# Patient Record
Sex: FEMALE | Race: WHITE | Employment: FULL TIME | URBAN - METROPOLITAN AREA
[De-identification: names, ages, dates, MRNs, and addresses within clinical notes are randomized per-mention and may not be internally consistent; named-entity substitution may affect disease eponyms.]

---

## 2017-05-23 ENCOUNTER — HOSPITAL ENCOUNTER (OUTPATIENT)
Dept: MAMMOGRAPHY | Age: 42
Discharge: HOME | End: 2017-05-23
Attending: SPECIALIST
Payer: COMMERCIAL

## 2017-05-23 DIAGNOSIS — Z12.31 VISIT FOR SCREENING MAMMOGRAM: ICD-10-CM

## 2017-05-23 PROCEDURE — 77067 SCR MAMMO BI INCL CAD: CPT

## 2018-07-27 ENCOUNTER — HOSPITAL ENCOUNTER (OUTPATIENT)
Dept: MAMMOGRAPHY | Age: 43
Discharge: HOME | End: 2018-07-27
Attending: SPECIALIST
Payer: COMMERCIAL

## 2018-07-27 DIAGNOSIS — Z12.31 VISIT FOR SCREENING MAMMOGRAM: ICD-10-CM

## 2018-07-27 PROCEDURE — 77063 BREAST TOMOSYNTHESIS BI: CPT

## 2019-10-03 ENCOUNTER — HOSPITAL ENCOUNTER (OUTPATIENT)
Dept: MAMMOGRAPHY | Age: 44
Discharge: HOME | End: 2019-10-03
Attending: SPECIALIST
Payer: COMMERCIAL

## 2019-10-03 DIAGNOSIS — Z12.39 BREAST SCREENING: ICD-10-CM

## 2019-10-03 PROCEDURE — 77067 SCR MAMMO BI INCL CAD: CPT

## 2021-03-16 ENCOUNTER — TRANSCRIBE ORDER (OUTPATIENT)
Dept: SCHEDULING | Age: 46
End: 2021-03-16

## 2021-03-16 DIAGNOSIS — Z12.31 VISIT FOR SCREENING MAMMOGRAM: Primary | ICD-10-CM

## 2021-06-24 ENCOUNTER — HOSPITAL ENCOUNTER (OUTPATIENT)
Dept: MAMMOGRAPHY | Age: 46
Discharge: HOME | End: 2021-06-24
Attending: SPECIALIST
Payer: COMMERCIAL

## 2021-06-24 DIAGNOSIS — Z12.31 VISIT FOR SCREENING MAMMOGRAM: ICD-10-CM

## 2021-06-24 PROCEDURE — 77067 SCR MAMMO BI INCL CAD: CPT

## 2022-10-19 ENCOUNTER — OFFICE VISIT (OUTPATIENT)
Dept: CARDIOLOGY CLINIC | Age: 47
End: 2022-10-19
Payer: COMMERCIAL

## 2022-10-19 VITALS
HEART RATE: 80 BPM | DIASTOLIC BLOOD PRESSURE: 62 MMHG | HEIGHT: 64 IN | SYSTOLIC BLOOD PRESSURE: 114 MMHG | WEIGHT: 148 LBS | BODY MASS INDEX: 25.27 KG/M2 | OXYGEN SATURATION: 98 %

## 2022-10-19 DIAGNOSIS — R07.9 CHEST PAIN, UNSPECIFIED TYPE: ICD-10-CM

## 2022-10-19 DIAGNOSIS — Z76.89 ESTABLISHING CARE WITH NEW DOCTOR, ENCOUNTER FOR: Primary | ICD-10-CM

## 2022-10-19 DIAGNOSIS — E78.5 HYPERLIPIDEMIA, UNSPECIFIED HYPERLIPIDEMIA TYPE: ICD-10-CM

## 2022-10-19 DIAGNOSIS — R06.02 SOB (SHORTNESS OF BREATH): ICD-10-CM

## 2022-10-19 PROCEDURE — 93000 ELECTROCARDIOGRAM COMPLETE: CPT | Performed by: INTERNAL MEDICINE

## 2022-10-19 PROCEDURE — 99204 OFFICE O/P NEW MOD 45 MIN: CPT | Performed by: INTERNAL MEDICINE

## 2022-10-19 RX ORDER — RIMEGEPANT SULFATE 75 MG/75MG
75 TABLET, ORALLY DISINTEGRATING ORAL
COMMUNITY
Start: 2022-08-15

## 2022-10-19 NOTE — PROGRESS NOTES
Chief Complaint   Patient presents with    New Patient    Referral / Consult     By Marcial Ewing     Chest Pain    Dizziness     Numbness in arm       Vitals:    10/19/22 1121   BP: 114/62   Pulse: 80   Height: 5' 4\" (1.626 m)   Weight: 148 lb (67.1 kg)   SpO2: 98%         Chest pain: pressure-center- Heavy most of the time    SOB: random     Palpitations: no    Dizziness: SOB to Dizziness or activity to Dizzy     Swelling: no    Refills:       Have you been to the ER, urgent care clinic since your last visit? Hospitalized since your last visit? Have you sen or consulted other health care providers outside of the [facility] since your last visit?  (Include any pap smears or colon screening.)

## 2022-10-19 NOTE — PROGRESS NOTES
Mari Lemus MD, MS, [facility] [place]            HISTORY OF PRESENT ILLNESS:    Génesis Peres is a 48 y.o. female referred for CP, numbness. Started 10/3 - that night - numbness in right arm, woke up - works at home, sitting at desk - CP, shapr took her breat away. Numbness yed h    [facility] EKG and Labs work was ok,  EKG NSR, iRBBB. . Via [facility] - referred to Cardiology. EKG today. ++ FH Cad - PGF heart attacks, dad heart problems. Two uncles MI. Total time spent >35 min, reviewing my prior notes, referring physician notes, other subspecialty and primary care notes, all available lab values, all available cardiac testing, all available radiology imaging, vital signs, weights, medications, potential medication interactions, family history, preparing my notes, updating diagnoses, correcting chart errors from other providers, documenting the above, discussing findings/results/testing methodologies/plan with patient, ordering tests/lab, sending prescriptions. SUMMARY:   Problem List  Date Reviewed: 10/19/2022            Codes Class Noted    Establishing care with new doctor, encounter for ICD-10-CM: Z76.89  ICD-9-CM: V65.8  10/20/2022        SOB (shortness of breath) ICD-10-CM: R06.02  ICD-9-CM: 786.05  10/20/2022        Chest pain ICD-10-CM: R07.9  ICD-9-CM: 786.50  10/20/2022           Current Outpatient Medications on File Prior to Visit   Medication Sig    Nurtec ODT 75 mg disintegrating tablet Take 75 mg by mouth daily as needed. omega-3s/dha/epa/fish oil/D3 (VITAMIN-D + OMEGA-3 PO) Take  by mouth. No current facility-administered medications on file prior to visit. CARDIOLOGY STUDIES TO DATE:  No results found for this visit on 10/19/22.                 CARDIAC ROS:   positive for chest pain, chest pressure/discomfort, dyspnea, palpitations    Past Medical History:   Diagnosis Date    Headache        Family History   Problem Relation Age of Onset    Cancer Mother     Headache Mother     Breast Cancer Mother 59    Cancer Maternal Grandmother     Breast Cancer Maternal Grandmother 48    Stroke Paternal Grandfather        Social History     Socioeconomic History    Marital status:      Spouse name: Not on file    Number of children: Not on file    Years of education: Not on file    Highest education level: Not on file   Occupational History    Not on file   Tobacco Use    Smoking status: Never    Smokeless tobacco: Never   Substance and Sexual Activity    Alcohol use: Yes    Drug use: No    Sexual activity: Not on file   Other Topics Concern    Not on file   Social History Narrative    Not on file     Social Determinants of Health     Financial Resource Strain: Not on file   Food Insecurity: Not on file   Transportation Needs: Not on file   Physical Activity: Not on file   Stress: Not on file   Social Connections: Not on file   Intimate Partner Violence: Not on file   Housing Stability: Not on file        GENERAL ROS:  A comprehensive review of systems was negative except for that written in the HPI.     Visit Vitals  /62   Pulse 80   Ht 5' 4\" (1.626 m)   Wt 148 lb (67.1 kg)   SpO2 98%   BMI 25.40 kg/m²     Vitals:    10/19/22 1121   BP: 114/62   Pulse: 80   SpO2: 98%   Weight: 148 lb (67.1 kg)   Height: 5' 4\" (1.626 m)        Wt Readings from Last 3 Encounters:   10/19/22 148 lb (67.1 kg)   07/27/16 130 lb 3.2 oz (59.1 kg)   07/03/16 132 lb (59.9 kg)            BP Readings from Last 3 Encounters:   10/19/22 114/62   07/27/16 104/62   07/03/16 103/58       PHYSICAL EXAM  General appearance: alert, cooperative, no distress, appears stated age  Neck: supple, symmetrical, trachea midline, no adenopathy, thyroid: not enlarged, symmetric, no tenderness/mass/nodules, no carotid bruit, and no JVD  Lungs: clear to auscultation bilaterally  Heart: regular rate and rhythm, S1, S2 normal, no murmur, click, rub or gallop  Extremities: extremities normal, atraumatic, no cyanosis or edema    No results found for: CHOL, CHOLX, CHLST, CHOLV, [id number], HDL, HDLP, LDL, LDLC, DLDLP, TGLX, TRIGL, TRIGP, CHHD, CHHDX    Lab Results   Component Value Date/Time    WBC 5.5 07/03/2016 04:03 PM    HGB 13.6 07/03/2016 04:03 PM    HCT 41.7 07/03/2016 04:03 PM    PLATELET 305 91/57/2730 04:03 PM    MCV 92.1 07/03/2016 04:03 PM        No results found for: CHOL, CHOLPOCT, CHOLX, CHLST, CHOLV, HDL, HDLP, LDL, LDLC, DLDLP, TGLX, TRIGL, TRIGP, TGLPOCT, NTGLT, CHHD, CHHDX     ASSESSMENT    ICD-10-CM ICD-9-CM    1. Establishing care with new doctor, encounter for  Z76.89 V65.8 AMB POC EKG ROUTINE W/ 12 LEADS, INTER & REP      CT HEART W/O CONT WITH CALCIUM      2. SOB (shortness of breath)  R06.02 786.05 ECHO STRESS      CT HEART W/O CONT WITH CALCIUM      NMR LIPOPROFILE WITH LIPIDS (WITHOUT GRAPH)      LIPOPROTEIN (A)      CANCELED: LIPOPROTEIN (A)      CANCELED: NMR LIPOPROFILE WITH LIPIDS (WITHOUT GRAPH)      3. Chest pain, unspecified type  R07.9 786.50 ECHO STRESS      CT HEART W/O CONT WITH CALCIUM      NMR LIPOPROFILE WITH LIPIDS (WITHOUT GRAPH)      LIPOPROTEIN (A)      CANCELED: LIPOPROTEIN (A)      CANCELED: NMR LIPOPROFILE WITH LIPIDS (WITHOUT GRAPH)          Encounter Diagnoses   Name Primary?     Establishing care with new doctor, encounter for Yes    SOB (shortness of breath)     Chest pain, unspecified type      Orders Placed This Encounter    CT HEART W/O CONT WITH CALCIUM    NMR LIPOPROFILE    LIPOPROTEIN (A)    AMB POC EKG ROUTINE W/ 12 LEADS, INTER & REP    Nurtec ODT 75 mg disintegrating tablet    omega-3s/dha/epa/fish oil/D3 (VITAMIN-D + OMEGA-3 PO)       Josy Tavares MD  10/20/2022        [address]   [address] [facility] [address]  [address]  [phone] (F)    [address] [facility]  [id number] [facility] [address]  [phone] (P)  [phone] (F)    ATTENTION:   This medical record was transcribed using an electronic medical records/speech recognition system. Although proofread, it may and can contain electronic, spelling and other errors. Corrections may be executed at a later time. Please feel free to contact us for any clarifications as needed.

## 2022-10-19 NOTE — PROGRESS NOTES
Per Dr. Mimi Calderón-   [facility] lipid profile  Lp (a)  Stress echo  CCS   JEREMIAH    Lab slip and instruction provided to patient. CT CCS info provided to patient.

## 2022-10-20 PROBLEM — Z76.89 ESTABLISHING CARE WITH NEW DOCTOR, ENCOUNTER FOR: Status: ACTIVE | Noted: 2022-10-20

## 2022-10-20 PROBLEM — R06.02 SOB (SHORTNESS OF BREATH): Status: ACTIVE | Noted: 2022-10-20

## 2022-10-20 PROBLEM — R07.9 CHEST PAIN: Status: ACTIVE | Noted: 2022-10-20

## 2022-11-01 RX ORDER — ROSUVASTATIN CALCIUM 20 MG/1
20 TABLET, COATED ORAL
Qty: 90 TABLET | Refills: 3 | Status: SHIPPED | OUTPATIENT
Start: 2022-11-01

## 2022-11-22 ENCOUNTER — APPOINTMENT (OUTPATIENT)
Dept: CARDIOLOGY CLINIC | Age: 47
End: 2022-11-22

## 2022-11-22 ENCOUNTER — ANCILLARY PROCEDURE (OUTPATIENT)
Dept: CARDIOLOGY CLINIC | Age: 47
End: 2022-11-22

## 2022-11-22 ENCOUNTER — OFFICE VISIT (OUTPATIENT)
Dept: CARDIOLOGY CLINIC | Age: 47
End: 2022-11-22

## 2022-11-22 ENCOUNTER — HOSPITAL ENCOUNTER (OUTPATIENT)
Dept: CT IMAGING | Age: 47
Discharge: HOME | End: 2022-11-22
Attending: INTERNAL MEDICINE
Payer: SELF-PAY

## 2022-11-22 VITALS
HEIGHT: 64 IN | HEART RATE: 106 BPM | DIASTOLIC BLOOD PRESSURE: 62 MMHG | WEIGHT: 143.2 LBS | BODY MASS INDEX: 24.45 KG/M2 | SYSTOLIC BLOOD PRESSURE: 118 MMHG | OXYGEN SATURATION: 96 %

## 2022-11-22 VITALS — HEIGHT: 64 IN | BODY MASS INDEX: 25.27 KG/M2 | WEIGHT: 148 LBS

## 2022-11-22 DIAGNOSIS — R07.9 CHEST PAIN, UNSPECIFIED TYPE: ICD-10-CM

## 2022-11-22 DIAGNOSIS — Z76.89 ESTABLISHING CARE WITH NEW DOCTOR, ENCOUNTER FOR: ICD-10-CM

## 2022-11-22 DIAGNOSIS — R06.02 SOB (SHORTNESS OF BREATH): ICD-10-CM

## 2022-11-22 DIAGNOSIS — E78.41 ELEVATED LIPOPROTEIN(A): ICD-10-CM

## 2022-11-22 DIAGNOSIS — Z82.49 FAMILY HISTORY OF PREMATURE CAD: ICD-10-CM

## 2022-11-22 DIAGNOSIS — E78.5 HYPERLIPIDEMIA, UNSPECIFIED HYPERLIPIDEMIA TYPE: Primary | ICD-10-CM

## 2022-11-22 LAB
STRESS ANGINA INDEX: 0
STRESS BASELINE DIAS BP: 64 MMHG
STRESS BASELINE HR: 85 BPM
STRESS BASELINE ST DEPRESSION: 0 MM
STRESS BASELINE SYS BP: 114 MMHG
STRESS ESTIMATED WORKLOAD: 13.1 METS
STRESS EXERCISE DUR MIN: 11 MIN
STRESS EXERCISE DUR SEC: 0 SEC
STRESS O2 SAT PEAK: 99 %
STRESS O2 SAT REST: 99 %
STRESS PEAK DIAS BP: 80 MMHG
STRESS PEAK SYS BP: 158 MMHG
STRESS PERCENT HR ACHIEVED: 105 %
STRESS POST PEAK HR: 181 BPM
STRESS RATE PRESSURE PRODUCT: NORMAL BPM*MMHG
STRESS TARGET HR: 172 BPM

## 2022-11-22 PROCEDURE — 75571 CT HRT W/O DYE W/CA TEST: CPT

## 2022-11-22 PROCEDURE — 99214 OFFICE O/P EST MOD 30 MIN: CPT | Performed by: INTERNAL MEDICINE

## 2022-11-22 NOTE — PROGRESS NOTES
Marian Luther is a 48 y.o. female    Chief Complaint   Patient presents with    Follow-up    Shortness of Breath    Chest Pain    Cholesterol Problem       Vitals:    11/22/22 1146   BP: 118/62   BP 1 Location: Left upper arm   BP Patient Position: Sitting   Pulse: (!) 106   Height: 5' 4\" (1.626 m)   Weight: 143 lb 3.2 oz (65 kg)   SpO2: 96%       Chest pain no    SOB patient states SOB     Dizziness no    Swelling no    Refills no      1. Have you been to the ER, urgent care clinic since your last visit? Hospitalized since your last visit? No    2. Have you seen or consulted any other health care providers outside of the [address] [place] [address] since your last visit? Include any pap smears or colon screening.  No

## 2022-11-22 NOTE — PROGRESS NOTES
Liz Curran MD, MS, [facility] [place]            HISTORY OF PRESENT ILLNESS:    Clinton Burleson is a 48 y.o. female referred for CP, numbness here for FU. Started 10/3 - that night - numbness in right arm, woke up - works at home, sitting at desk - CP, shapr took her breat away. Numbness yed h    [facility] EKG and Labs work was ok,  EKG NSR, iRBBB. . Via [facility] - referred to Cardiology. EKG today. ++ FH Cad - PGF heart attacks, dad heart problems. Two uncles MI. . Started on rosuva 20. NMR lipid panel elevated Lp(a), elevated LDL-P. Family member have not tolerated statins. She is a good candidate for PCSK9 given Lp(a). Had CCS done earlier today - read not yet back. Stress echo done prior to her appt normal.    Discussed repeat lipid panel in 3-4 months, FU.      SUMMARY:   Problem List  Date Reviewed: 11/22/2022            Codes Class Noted    Elevated lipoprotein(a) ICD-10-CM: E78.41  ICD-9-CM: 272.8  11/22/2022        Hyperlipidemia ICD-10-CM: E78.5  ICD-9-CM: 272.4  11/22/2022        Family history of premature CAD ICD-10-CM: Z82.49  ICD-9-CM: V17.3  11/22/2022        Establishing care with new doctor, encounter for ICD-10-CM: Z76.89  ICD-9-CM: V65.8  10/20/2022        SOB (shortness of breath) ICD-10-CM: R06.02  ICD-9-CM: 786.05  10/20/2022        Chest pain ICD-10-CM: R07.9  ICD-9-CM: 786.50  10/20/2022           Current Outpatient Medications on File Prior to Visit   Medication Sig    rosuvastatin (CRESTOR) 20 mg tablet Take 1 Tablet by mouth nightly. Nurtec ODT 75 mg disintegrating tablet Take 75 mg by mouth daily as needed. No current facility-administered medications on file prior to visit. CARDIOLOGY STUDIES TO DATE:  No results found for this visit on 11/22/22.                 CARDIAC ROS:   positive for chest pain, chest pressure/discomfort, dyspnea, palpitations    Past Medical History:   Diagnosis Date    Headache Family History   Problem Relation Age of Onset    Cancer Mother     Headache Mother     Breast Cancer Mother 59    Cancer Maternal Grandmother     Breast Cancer Maternal Grandmother 48    Stroke Paternal Grandfather        Social History     Socioeconomic History    Marital status:      Spouse name: Not on file    Number of children: Not on file    Years of education: Not on file    Highest education level: Not on file   Occupational History    Not on file   Tobacco Use    Smoking status: Never    Smokeless tobacco: Never   Substance and Sexual Activity    Alcohol use: Yes    Drug use: No    Sexual activity: Not on file   Other Topics Concern    Not on file   Social History Narrative    Not on file     Social Determinants of Health     Financial Resource Strain: Not on file   Food Insecurity: Not on file   Transportation Needs: Not on file   Physical Activity: Not on file   Stress: Not on file   Social Connections: Not on file   Intimate Partner Violence: Not on file   Housing Stability: Not on file        GENERAL ROS:  A comprehensive review of systems was negative except for that written in the HPI.     Visit Vitals  /62 (BP 1 Location: Left upper arm, BP Patient Position: Sitting)   Pulse (!) 106   Ht 5' 4\" (1.626 m)   Wt 65 kg (143 lb 3.2 oz)   SpO2 96%   BMI 24.58 kg/m²     Vitals:    11/22/22 1146   BP: 118/62   Pulse: (!) 106   SpO2: 96%   Weight: 65 kg (143 lb 3.2 oz)   Height: 5' 4\" (1.626 m)        Wt Readings from Last 3 Encounters:   11/22/22 65 kg (143 lb 3.2 oz)   11/22/22 67.1 kg (148 lb)   10/19/22 67.1 kg (148 lb)            BP Readings from Last 3 Encounters:   11/22/22 118/62   10/19/22 114/62   07/27/16 104/62       PHYSICAL EXAM  General appearance: alert, cooperative, no distress, appears stated age  Neck: supple, symmetrical, trachea midline, no adenopathy, thyroid: not enlarged, symmetric, no tenderness/mass/nodules, no carotid bruit, and no JVD  Lungs: clear to auscultation bilaterally  Heart: regular rate and rhythm, S1, S2 normal, no murmur, click, rub or gallop  Extremities: extremities normal, atraumatic, no cyanosis or edema    Lab Results   Component Value Date/Time    Cholesterol, Total 254 (H) 10/19/2022 12:23 PM       Lab Results   Component Value Date/Time    WBC 5.5 07/03/2016 04:03 PM    HGB 13.6 07/03/2016 04:03 PM    HCT 41.7 07/03/2016 04:03 PM    PLATELET 035 20/86/9247 04:03 PM    MCV 92.1 07/03/2016 04:03 PM        Lab Results   Component Value Date/Time    Cholesterol, Total 254 (H) 10/19/2022 12:23 PM        ASSESSMENT    ICD-10-CM ICD-9-CM    1. Hyperlipidemia, unspecified hyperlipidemia type  E78.5 272.4 evolocumab (REPATHA SURECLICK) pen injection      LIPID PANEL      2. Elevated lipoprotein(a)  E78.41 272.8 evolocumab (REPATHA SURECLICK) pen injection      3. SOB (shortness of breath)  R06.02 786.05       4. Chest pain, unspecified type  R07.9 786.50       5. Family history of premature CAD  Z82.49 V17.3             Encounter Diagnoses   Name Primary? Hyperlipidemia, unspecified hyperlipidemia type Yes    Elevated lipoprotein(a)     SOB (shortness of breath)     Chest pain, unspecified type     Family history of premature CAD        Orders Placed This Encounter    LIPID PANEL    evolocumab (Pelon Gimenez) pen injection           Georgia Tavares MD  11/22/2022        [address]   [address]  [address]  [phone] (F)    [address]  [phone] (P)  [phone] (F)    ATTENTION:   This medical record was transcribed using an electronic medical records/speech recognition system. Although proofread, it may and can contain electronic, spelling and other errors. Corrections may be executed at a later time. Please feel free to contact us for any clarifications as needed.

## 2022-12-29 DIAGNOSIS — R07.9 CHEST PAIN, UNSPECIFIED TYPE: ICD-10-CM

## 2022-12-29 DIAGNOSIS — E78.5 HYPERLIPIDEMIA, UNSPECIFIED HYPERLIPIDEMIA TYPE: ICD-10-CM

## 2022-12-30 RX ORDER — ROSUVASTATIN CALCIUM 20 MG/1
20 TABLET, COATED ORAL
Qty: 90 TABLET | Refills: 3 | OUTPATIENT
Start: 2022-12-30

## 2022-12-30 NOTE — TELEPHONE ENCOUNTER
Refill per VO of Dr. Vivien Suh  Last appt: 11/22/2022  Future Appointments   Date Time Provider Gauri Qian   1/23/2023  5:20 PM [facility]   6/12/2023  9:00 AM Santiago Shaffer MD [facility] BS AMB       Requested Prescriptions     Refused Prescriptions Disp Refills    rosuvastatin (CRESTOR) 20 mg tablet 90 Tablet 3     Sig: Take 1 Tablet by mouth nightly.      Refused By: Carlos Jones     Reason for Refusal: Patient has requested refill too soon

## 2023-01-18 ENCOUNTER — TRANSCRIBE ORDER (OUTPATIENT)
Dept: MAMMOGRAPHY | Age: 48
End: 2023-01-18

## 2023-01-18 DIAGNOSIS — Z12.31 VISIT FOR SCREENING MAMMOGRAM: Primary | ICD-10-CM

## 2023-01-23 ENCOUNTER — HOSPITAL ENCOUNTER (OUTPATIENT)
Dept: MAMMOGRAPHY | Age: 48
Discharge: HOME | End: 2023-01-23
Payer: COMMERCIAL

## 2023-01-23 DIAGNOSIS — Z12.31 VISIT FOR SCREENING MAMMOGRAM: ICD-10-CM

## 2023-01-23 PROCEDURE — 77067 SCR MAMMO BI INCL CAD: CPT

## 2023-03-08 ENCOUNTER — APPOINTMENT (OUTPATIENT)
Dept: CT IMAGING | Age: 48
End: 2023-03-08
Payer: COMMERCIAL

## 2023-03-08 ENCOUNTER — HOSPITAL ENCOUNTER (EMERGENCY)
Age: 48
Discharge: HOME | End: 2023-03-08
Attending: STUDENT IN AN ORGANIZED HEALTH CARE EDUCATION/TRAINING PROGRAM
Payer: COMMERCIAL

## 2023-03-08 VITALS
WEIGHT: 148 LBS | RESPIRATION RATE: 16 BRPM | SYSTOLIC BLOOD PRESSURE: 131 MMHG | HEART RATE: 68 BPM | HEIGHT: 64 IN | DIASTOLIC BLOOD PRESSURE: 81 MMHG | BODY MASS INDEX: 25.27 KG/M2 | OXYGEN SATURATION: 100 % | TEMPERATURE: 98 F

## 2023-03-08 DIAGNOSIS — R10.31 ABDOMINAL PAIN, RIGHT LOWER QUADRANT: Primary | ICD-10-CM

## 2023-03-08 LAB
ALBUMIN SERPL-MCNC: 4 G/DL (ref 3.5–5)
ALBUMIN/GLOB SERPL: 1 (ref 1.1–2.2)
ALP SERPL-CCNC: 62 U/L (ref 45–117)
ALT SERPL-CCNC: 18 U/L (ref 12–78)
ANION GAP SERPL CALC-SCNC: 2 MMOL/L (ref 5–15)
APPEARANCE UR: CLEAR
AST SERPL-CCNC: 14 U/L (ref 15–37)
BACTERIA URNS QL MICRO: NEGATIVE /HPF
BASOPHILS # BLD: 0 K/UL (ref 0–0.1)
BASOPHILS NFR BLD: 1 % (ref 0–1)
BILIRUB SERPL-MCNC: 0.6 MG/DL (ref 0.2–1)
BILIRUB UR QL: NEGATIVE
BUN SERPL-MCNC: 11 MG/DL (ref 6–20)
BUN/CREAT SERPL: 12 (ref 12–20)
CALCIUM SERPL-MCNC: 10 MG/DL (ref 8.5–10.1)
CHLORIDE SERPL-SCNC: 108 MMOL/L (ref 97–108)
CO2 SERPL-SCNC: 30 MMOL/L (ref 21–32)
COLOR UR: ABNORMAL
COMMENT, HOLDF: NORMAL
CREAT SERPL-MCNC: 0.9 MG/DL (ref 0.55–1.02)
DIFFERENTIAL METHOD BLD: NORMAL
EOSINOPHIL # BLD: 0.1 K/UL (ref 0–0.4)
EOSINOPHIL NFR BLD: 1 % (ref 0–7)
EPITH CASTS URNS QL MICRO: ABNORMAL /LPF
ERYTHROCYTE [DISTWIDTH] IN BLOOD BY AUTOMATED COUNT: 12.7 % (ref 11.5–14.5)
GLOBULIN SER CALC-MCNC: 4 G/DL (ref 2–4)
GLUCOSE SERPL-MCNC: 100 MG/DL (ref 65–100)
GLUCOSE UR STRIP.AUTO-MCNC: NEGATIVE MG/DL
HCG UR QL: NEGATIVE
HCT VFR BLD AUTO: 44.6 % (ref 35–47)
HGB BLD-MCNC: 14.6 G/DL (ref 11.5–16)
HGB UR QL STRIP: ABNORMAL
HYALINE CASTS URNS QL MICRO: ABNORMAL /LPF (ref 0–2)
IMM GRANULOCYTES # BLD AUTO: 0 K/UL (ref 0–0.04)
IMM GRANULOCYTES NFR BLD AUTO: 0 % (ref 0–0.5)
KETONES UR QL STRIP.AUTO: NEGATIVE MG/DL
LEUKOCYTE ESTERASE UR QL STRIP.AUTO: NEGATIVE
LIPASE SERPL-CCNC: 70 U/L (ref 73–393)
LYMPHOCYTES # BLD: 1.9 K/UL (ref 0.8–3.5)
LYMPHOCYTES NFR BLD: 28 % (ref 12–49)
MCH RBC QN AUTO: 30.7 PG (ref 26–34)
MCHC RBC AUTO-ENTMCNC: 32.7 G/DL (ref 30–36.5)
MCV RBC AUTO: 93.7 FL (ref 80–99)
MONOCYTES # BLD: 0.4 K/UL (ref 0–1)
MONOCYTES NFR BLD: 6 % (ref 5–13)
NEUTS SEG # BLD: 4.2 K/UL (ref 1.8–8)
NEUTS SEG NFR BLD: 64 % (ref 32–75)
NITRITE UR QL STRIP.AUTO: NEGATIVE
NRBC # BLD: 0 K/UL (ref 0–0.01)
NRBC BLD-RTO: 0 PER 100 WBC
PH UR STRIP: 7 (ref 5–8)
PLATELET # BLD AUTO: 235 K/UL (ref 150–400)
PMV BLD AUTO: 9.8 FL (ref 8.9–12.9)
POTASSIUM SERPL-SCNC: 4.1 MMOL/L (ref 3.5–5.1)
PROT SERPL-MCNC: 8 G/DL (ref 6.4–8.2)
PROT UR STRIP-MCNC: NEGATIVE MG/DL
RBC # BLD AUTO: 4.76 M/UL (ref 3.8–5.2)
RBC #/AREA URNS HPF: ABNORMAL /HPF (ref 0–5)
SAMPLES BEING HELD,HOLD: NORMAL
SODIUM SERPL-SCNC: 140 MMOL/L (ref 136–145)
SP GR UR REFRACTOMETRY: <1.005 (ref 1–1.03)
UR CULT HOLD, URHOLD: NORMAL
UROBILINOGEN UR QL STRIP.AUTO: 0.2 EU/DL (ref 0.2–1)
WBC # BLD AUTO: 6.6 K/UL (ref 3.6–11)
WBC URNS QL MICRO: ABNORMAL /HPF (ref 0–4)

## 2023-03-08 PROCEDURE — 96374 THER/PROPH/DIAG INJ IV PUSH: CPT

## 2023-03-08 PROCEDURE — 96361 HYDRATE IV INFUSION ADD-ON: CPT

## 2023-03-08 PROCEDURE — 74011000636 HC RX REV CODE- 636: Performed by: STUDENT IN AN ORGANIZED HEALTH CARE EDUCATION/TRAINING PROGRAM

## 2023-03-08 PROCEDURE — 83690 ASSAY OF LIPASE: CPT

## 2023-03-08 PROCEDURE — 74177 CT ABD & PELVIS W/CONTRAST: CPT

## 2023-03-08 PROCEDURE — 96375 TX/PRO/DX INJ NEW DRUG ADDON: CPT

## 2023-03-08 PROCEDURE — 80053 COMPREHEN METABOLIC PANEL: CPT

## 2023-03-08 PROCEDURE — 85025 COMPLETE CBC W/AUTO DIFF WBC: CPT

## 2023-03-08 PROCEDURE — 99285 EMERGENCY DEPT VISIT HI MDM: CPT

## 2023-03-08 PROCEDURE — 36415 COLL VENOUS BLD VENIPUNCTURE: CPT

## 2023-03-08 PROCEDURE — 81025 URINE PREGNANCY TEST: CPT

## 2023-03-08 PROCEDURE — 74011250636 HC RX REV CODE- 250/636

## 2023-03-08 PROCEDURE — 81001 URINALYSIS AUTO W/SCOPE: CPT

## 2023-03-08 RX ORDER — KETOROLAC TROMETHAMINE 10 MG/1
10 TABLET, FILM COATED ORAL
Qty: 12 TABLET | Refills: 0 | Status: SHIPPED | OUTPATIENT
Start: 2023-03-08 | End: 2023-03-11

## 2023-03-08 RX ORDER — ONDANSETRON 2 MG/ML
4 INJECTION INTRAMUSCULAR; INTRAVENOUS
Status: COMPLETED | OUTPATIENT
Start: 2023-03-08 | End: 2023-03-08

## 2023-03-08 RX ORDER — KETOROLAC TROMETHAMINE 30 MG/ML
30 INJECTION, SOLUTION INTRAMUSCULAR; INTRAVENOUS
Status: COMPLETED | OUTPATIENT
Start: 2023-03-08 | End: 2023-03-08

## 2023-03-08 RX ORDER — KETOROLAC TROMETHAMINE 10 MG/1
10 TABLET, FILM COATED ORAL
Qty: 12 TABLET | Refills: 0 | Status: SHIPPED | OUTPATIENT
Start: 2023-03-08 | End: 2023-03-08 | Stop reason: SDUPTHER

## 2023-03-08 RX ADMIN — KETOROLAC TROMETHAMINE 30 MG: 30 INJECTION, SOLUTION INTRAMUSCULAR; INTRAVENOUS at 09:01

## 2023-03-08 RX ADMIN — SODIUM CHLORIDE 1000 ML: 9 INJECTION, SOLUTION INTRAVENOUS at 08:56

## 2023-03-08 RX ADMIN — IOPAMIDOL 100 ML: 755 INJECTION, SOLUTION INTRAVENOUS at 09:12

## 2023-03-08 RX ADMIN — ONDANSETRON 4 MG: 2 INJECTION INTRAMUSCULAR; INTRAVENOUS at 09:01

## 2023-03-08 NOTE — DISCHARGE INSTRUCTIONS
Follow-up with the provided urologist. Call the kidney stone hotline as soon as possible. Go to nearest emergency department with worsening symptoms, unable to keep fluids down, fever, or any other concerns.

## 2023-03-08 NOTE — ED PROVIDER NOTES
Venita Mccracken is a 50 y.o. female c/o RLQ abdominal pain intermittently since today around 5 AM. Pt reports taking ASA today for pain @ 5:30 AM. + nausea. Pt reports she was exercising when this occurred. Pt reports she is on her menstrual cycle. Denies fever, chills, dysuria. Medical hx: high cholesterol, Tubal ligation     The history is provided by the patient. No  was used. Past Medical History:   Diagnosis Date    Headache        Past Surgical History:   Procedure Laterality Date    HX TUBAL LIGATION           Family History:   Problem Relation Age of Onset    Cancer Mother     Headache Mother     Breast Cancer Mother 59    Cancer Maternal Grandmother     Breast Cancer Maternal Grandmother 48    Stroke Paternal Grandfather        Social History     Socioeconomic History    Marital status:      Spouse name: Not on file    Number of children: Not on file    Years of education: Not on file    Highest education level: Not on file   Occupational History    Not on file   Tobacco Use    Smoking status: Never    Smokeless tobacco: Never   Substance and Sexual Activity    Alcohol use: Yes    Drug use: No    Sexual activity: Not on file   Other Topics Concern    Not on file   Social History Narrative    Not on file     Social Determinants of Health     Financial Resource Strain: Not on file   Food Insecurity: Not on file   Transportation Needs: Not on file   Physical Activity: Not on file   Stress: Not on file   Social Connections: Not on file   Intimate Partner Violence: Not on file   Housing Stability: Not on file         ALLERGIES: Patient has no known allergies. Review of Systems   Constitutional:  Negative for activity change, appetite change, chills and fever. HENT:  Negative for rhinorrhea and sore throat. Eyes:  Negative for visual disturbance. Respiratory:  Negative for cough and shortness of breath. Cardiovascular:  Negative for chest pain. Gastrointestinal:  Positive for abdominal pain and nausea. Negative for diarrhea and vomiting. Genitourinary:  Negative for decreased urine volume, difficulty urinating, dysuria and flank pain. Musculoskeletal:  Negative for back pain and myalgias. Skin:  Negative for rash. Neurological:  Negative for weakness and headaches. All other systems reviewed and are negative. There were no vitals filed for this visit. Physical Exam  Vitals reviewed. Constitutional:       Appearance: Normal appearance. HENT:      Head: Normocephalic. Right Ear: Tympanic membrane normal.      Left Ear: Tympanic membrane normal.      Nose: No rhinorrhea. Mouth/Throat:      Mouth: Mucous membranes are moist.   Eyes:      Extraocular Movements: Extraocular movements intact. Pupils: Pupils are equal, round, and reactive to light. Cardiovascular:      Rate and Rhythm: Normal rate and regular rhythm. Pulses: Normal pulses. Heart sounds: Normal heart sounds. Pulmonary:      Effort: Pulmonary effort is normal.      Breath sounds: Normal breath sounds. Abdominal:      General: Abdomen is flat. Palpations: Abdomen is soft. There is no mass. Tenderness: There is abdominal tenderness in the right lower quadrant and epigastric area. There is guarding. There is no right CVA tenderness or left CVA tenderness. Positive signs include McBurney's sign. Musculoskeletal:         General: Normal range of motion. Cervical back: Normal range of motion and neck supple. Skin:     General: Skin is warm and dry. Neurological:      General: No focal deficit present. Mental Status: She is alert and oriented to person, place, and time. Mental status is at baseline. Psychiatric:         Mood and Affect: Mood normal.        Medical Decision Making  Amount and/or Complexity of Data Reviewed  Labs: ordered. Decision-making details documented in ED Course.   Radiology: ordered. Risk  Prescription drug management. Patient is a 49-year-old female presenting to the emergency room complaining of right lower quadrant pain intermittently since 5 AM today. Doubt ectopic pregnancy given urine hCG was negative. Doubt urinary tract infection given patient's urine was negative for nitrites, leukocyte esterase, elevated white blood cell, and bacteria. Doubt acute pancreatitis given lipase was 70. Doubt acute appendicitis given CT of the abdomen showed a normal appendix. Doubt acute cholecystitis given gallbladder was normal on CT scan. CT scan did show \"mild right hydroureter and ureteral wall thickening\". Patient was given Toradol, Zofran, and IV fluids while in the emergency room. Patient reports improvement of pain after medications. Patient is stable for discharge home. Patient educated about possible passing of a kidney stone given right hydroureter and urethral wall thickening. Patient encouraged to follow-up with the stone hotline and urologist listed. Patient also educated about possible left ovarian cyst and encouraged to follow-up with OB/GYN. Strict return to ED precautions given. ACI discussed with the patient; see instruction below. Patient verbalized understanding. Procedures    N/A    LABORATORY TESTS:  Recent Results (from the past 12 hour(s))   CBC WITH AUTOMATED DIFF    Collection Time: 03/08/23  8:53 AM   Result Value Ref Range    WBC 6.6 3.6 - 11.0 K/uL    RBC 4.76 3.80 - 5.20 M/uL    HGB 14.6 11.5 - 16.0 g/dL    HCT 44.6 35.0 - 47.0 %    MCV 93.7 80.0 - 99.0 FL    MCH 30.7 26.0 - 34.0 PG    MCHC 32.7 30.0 - 36.5 g/dL    RDW 12.7 11.5 - 14.5 %    PLATELET 093 052 - 849 K/uL    MPV 9.8 8.9 - 12.9 FL    NRBC 0.0 0  WBC    ABSOLUTE NRBC 0.00 0.00 - 0.01 K/uL    NEUTROPHILS 64 32 - 75 %    LYMPHOCYTES 28 12 - 49 %    MONOCYTES 6 5 - 13 %    EOSINOPHILS 1 0 - 7 %    BASOPHILS 1 0 - 1 %    IMMATURE GRANULOCYTES 0 0.0 - 0.5 %    ABS.  NEUTROPHILS 4.2 1.8 - 8.0 K/UL    ABS. LYMPHOCYTES 1.9 0.8 - 3.5 K/UL    ABS. MONOCYTES 0.4 0.0 - 1.0 K/UL    ABS. EOSINOPHILS 0.1 0.0 - 0.4 K/UL    ABS. BASOPHILS 0.0 0.0 - 0.1 K/UL    ABS. IMM. GRANS. 0.0 0.00 - 0.04 K/UL    DF AUTOMATED     METABOLIC PANEL, COMPREHENSIVE    Collection Time: 03/08/23  8:53 AM   Result Value Ref Range    Sodium 140 136 - 145 mmol/L    Potassium 4.1 3.5 - 5.1 mmol/L    Chloride 108 97 - 108 mmol/L    CO2 30 21 - 32 mmol/L    Anion gap 2 (L) 5 - 15 mmol/L    Glucose 100 65 - 100 mg/dL    BUN 11 6 - 20 MG/DL    Creatinine 0.90 0.55 - 1.02 MG/DL    BUN/Creatinine ratio 12 12 - 20      eGFR >60 >60 ml/min/1.73m2    Calcium 10.0 8.5 - 10.1 MG/DL    Bilirubin, total 0.6 0.2 - 1.0 MG/DL    ALT (SGPT) 18 12 - 78 U/L    AST (SGOT) 14 (L) 15 - 37 U/L    Alk. phosphatase 62 45 - 117 U/L    Protein, total 8.0 6.4 - 8.2 g/dL    Albumin 4.0 3.5 - 5.0 g/dL    Globulin 4.0 2.0 - 4.0 g/dL    A-G Ratio 1.0 (L) 1.1 - 2.2     LIPASE    Collection Time: 03/08/23  8:53 AM   Result Value Ref Range    Lipase 70 (L) 73 - 393 U/L   URINALYSIS W/MICROSCOPIC    Collection Time: 03/08/23  8:53 AM   Result Value Ref Range    Color YELLOW/STRAW      Appearance CLEAR CLEAR      Specific gravity <1.005 1.003 - 1.030    pH (UA) 7.0 5.0 - 8.0      Protein Negative NEG mg/dL    Glucose Negative NEG mg/dL    Ketone Negative NEG mg/dL    Bilirubin Negative NEG      Blood LARGE (A) NEG      Urobilinogen 0.2 0.2 - 1.0 EU/dL    Nitrites Negative NEG      Leukocyte Esterase Negative NEG      WBC 0-4 0 - 4 /hpf    RBC  0 - 5 /hpf    Epithelial cells FEW FEW /lpf    Bacteria Negative NEG /hpf    Hyaline cast 0-2 0 - 2 /lpf   SAMPLES BEING HELD    Collection Time: 03/08/23  8:53 AM   Result Value Ref Range    SAMPLES BEING HELD 1RED,1SST,1DRK GRN     COMMENT        Add-on orders for these samples will be processed based on acceptable specimen integrity and analyte stability, which may vary by analyte.    URINE CULTURE HOLD SAMPLE Collection Time: 03/08/23  8:53 AM    Specimen: Urine   Result Value Ref Range    Urine culture hold        Urine on hold in Microbiology dept for 2 days. If unpreserved urine is submitted, it cannot be used for addtional testing after 24 hours, recollection will be required. HCG URINE, QL. - POC    Collection Time: 03/08/23  8:59 AM   Result Value Ref Range    Pregnancy test,urine (POC) Negative NEG         IMAGING RESULTS:  CT ABD PELV W CONT   Final Result   1. No evidence of acute appendicitis or other acute abnormality in the abdomen   or pelvis. 2.  Mild right hydroureter and ureteral wall thickening, correlate with   urinalysis. 3.  Incidental findings as above. MEDICATIONS GIVEN:  Medications   sodium chloride 0.9 % bolus infusion 1,000 mL (0 mL IntraVENous IV Completed 3/8/23 1008)   ondansetron (ZOFRAN) injection 4 mg (4 mg IntraVENous Given 3/8/23 0901)   ketorolac (TORADOL) injection 30 mg (30 mg IntraVENous Given 3/8/23 0901)   iopamidoL (ISOVUE-370) 370 mg iodine /mL (76 %) injection 100 mL (100 mL IntraVENous Given 3/8/23 0912)       IMPRESSION:  1. Abdominal pain, right lower quadrant        PLAN:  1. Toradol as needed for pain. Current Discharge Medication List        START taking these medications    Details   ketorolac (TORADOL) 10 mg tablet Take 1 Tablet by mouth every six (6) hours as needed for Pain for up to 3 days. Qty: 12 Tablet, Refills: 0  Start date: 3/8/2023, End date: 3/11/2023           2. Follow-up with Urology/ Kidney Stone Hotline. Follow-up Information       Follow up With Specialties Details Why [address]  Call   [address]  [phone]    Kidney Megha Katherin Hotline  Call   Please call [phone] for any questions about your kidney stones .           3. Return to ED if worse     Signed By: COMFORT Luna     March 8, 2023

## 2023-03-08 NOTE — ED TRIAGE NOTES
Pt arrives to the ER for complaints of RLQ abdominal pain that started at 0500 this morning. Pt reports that she was exercising when the pain started. Reports taking 81mg aspirin at 0530 this morning. Denies any vomiting, fever or chills. Denies any urinary symptoms. PMH of high cholesterol and tubal ligation.

## 2023-03-08 NOTE — Clinical Note
[address]  [facility] EMERGENCY DEPT  [address] [id number]  [phone]    Work/School Note    Date: 3/8/2023    To Whom It May concern:    Brian Theodore was seen and treated today in the emergency room by the following provider(s):  Attending Provider: Casie Heredia MD  Nurse Practitioner: Jose Saravia, [address] [place] [address]. Brian Theodore is excused from work/school on 3/8/2023 through 3/10/2023. She is medically clear to return to work/school on 3/11/2023.          Sincerely,          Vera Speak

## 2023-04-27 ENCOUNTER — PATIENT MESSAGE (OUTPATIENT)
Dept: CARDIOLOGY CLINIC | Age: 48
End: 2023-04-27

## 2023-04-27 ENCOUNTER — TELEPHONE (OUTPATIENT)
Dept: CARDIOLOGY CLINIC | Age: 48
End: 2023-04-27

## 2023-04-27 DIAGNOSIS — E78.5 HYPERLIPIDEMIA, UNSPECIFIED HYPERLIPIDEMIA TYPE: ICD-10-CM

## 2023-04-27 DIAGNOSIS — E78.41 ELEVATED LIPOPROTEIN(A): Primary | ICD-10-CM

## 2023-04-27 NOTE — TELEPHONE ENCOUNTER
Spoke with patient after ID x2 to convey results below     ----- Message from Lyda Castleman, MD sent at 4/27/2023 12:54 PM EDT -----  Your labs look great. Call with office with any questions.     Dr. Mao Jha

## 2023-05-08 DIAGNOSIS — E78.5 HYPERLIPIDEMIA: ICD-10-CM

## 2023-05-08 DIAGNOSIS — E78.41 ELEVATED LIPOPROTEIN(A): Primary | ICD-10-CM

## 2023-05-08 NOTE — PROGRESS NOTES
Orders Placed This Encounter   Procedures    Lipoprotein A (LPA)     Standing Status:   Future     Standing Expiration Date:   11/8/2023    Per Dr. David Jean- transcribed from paper.

## 2023-05-15 ENCOUNTER — TELEPHONE (OUTPATIENT)
Age: 48
End: 2023-05-15

## 2023-05-22 RX ORDER — ROSUVASTATIN CALCIUM 20 MG/1
1 TABLET, COATED ORAL NIGHTLY
COMMUNITY
Start: 2022-11-01

## 2023-05-22 RX ORDER — RIMEGEPANT SULFATE 75 MG/75MG
75 TABLET, ORALLY DISINTEGRATING ORAL DAILY PRN
COMMUNITY
Start: 2022-08-15

## 2023-06-12 DIAGNOSIS — E78.41 ELEVATED LIPOPROTEIN(A): ICD-10-CM

## 2023-06-12 DIAGNOSIS — E78.5 HYPERLIPIDEMIA: ICD-10-CM

## 2023-06-13 LAB — LPA SERPL-SCNC: 208.8 NMOL/L

## 2023-06-13 NOTE — RESULT ENCOUNTER NOTE
Your lipoprotein (a) was elevated, which make you at higher risk for coronary disease. We will continue your rosuvastatin for now, may consider a PCSK9 inhibitor in future.     Dr. Mendel Reyes

## 2023-07-05 ENCOUNTER — CLINICAL DOCUMENTATION (OUTPATIENT)
Age: 48
End: 2023-07-05

## 2023-07-05 ENCOUNTER — OFFICE VISIT (OUTPATIENT)
Age: 48
End: 2023-07-05
Payer: COMMERCIAL

## 2023-07-05 VITALS
SYSTOLIC BLOOD PRESSURE: 100 MMHG | BODY MASS INDEX: 25.16 KG/M2 | HEART RATE: 75 BPM | OXYGEN SATURATION: 98 % | RESPIRATION RATE: 16 BRPM | DIASTOLIC BLOOD PRESSURE: 70 MMHG | WEIGHT: 146.6 LBS

## 2023-07-05 DIAGNOSIS — E78.41 ELEVATED LIPOPROTEIN(A): Primary | ICD-10-CM

## 2023-07-05 DIAGNOSIS — E78.5 HYPERLIPIDEMIA, UNSPECIFIED HYPERLIPIDEMIA TYPE: ICD-10-CM

## 2023-07-05 DIAGNOSIS — Z82.49 FAMILY HISTORY OF PREMATURE CAD: ICD-10-CM

## 2023-07-05 PROCEDURE — 99214 OFFICE O/P EST MOD 30 MIN: CPT | Performed by: INTERNAL MEDICINE

## 2023-07-05 RX ORDER — CIPROFLOXACIN 500 MG/1
TABLET, FILM COATED ORAL
COMMUNITY
Start: 2023-07-03

## 2023-07-05 ASSESSMENT — PATIENT HEALTH QUESTIONNAIRE - PHQ9
1. LITTLE INTEREST OR PLEASURE IN DOING THINGS: 0
SUM OF ALL RESPONSES TO PHQ QUESTIONS 1-9: 0
SUM OF ALL RESPONSES TO PHQ QUESTIONS 1-9: 0
SUM OF ALL RESPONSES TO PHQ9 QUESTIONS 1 & 2: 0
SUM OF ALL RESPONSES TO PHQ QUESTIONS 1-9: 0
SUM OF ALL RESPONSES TO PHQ QUESTIONS 1-9: 0
2. FEELING DOWN, DEPRESSED OR HOPELESS: 0

## 2023-07-05 ASSESSMENT — ENCOUNTER SYMPTOMS
CHEST TIGHTNESS: 0
WHEEZING: 0
SHORTNESS OF BREATH: 0

## 2023-07-05 NOTE — PROGRESS NOTES
Per Dr. Aimee Power- follow up 6-8 months.
Xochilt Tillman is a 50 y.o. female    had concerns including Hyperlipidemia. Vitals:    07/05/23 1038   BP: 100/70   Site: Left Upper Arm   Position: Sitting   Cuff Size: Medium Adult   Pulse: 75   Resp: 16   SpO2: 98%   Weight: 146 lb 9.6 oz (66.5 kg)        Chest pain No    SOB No    Dizziness No    Swelling No    Refills No    Covid Vaccinated No      1. Have you been to the ER, urgent care clinic since your last visit? Yes for Kidney stone Hospitalized since your last visit? no    2. Have you seen or consulted any other health care providers outside of the [address] [place] [address] Avenue since your last visit? Include any pap smears or colon screening.  no
available in the patient's medical record. If you cannot access the medical record, please contact the sending organization for a detailed fax or copy. Study Impression: The study is normal.    Post-stress Echo: Left ventricle systolic function is hyperdynamic post-stress. The EF is 70 - 75%. Left Ventricle: Normal left ventricular systolic function. Left ventricle size is normal. Normal wall thickness. Normal wall motion. ECG: Stress ECG was negative for ischemia. Stress Test: A Kieran protocol stress test was performed. Overall, the patient's exercise capacity was above average for their age. The patient reached stage 4 of the protocol And was stressed for 11 min and 0 sec. Hemodynamics are adequate for diagnosis. Blood pressure demonstrated a normal response and heart rate demonstrated a normal response to stress. The patient's heart rate recovery was normal. The patient reported dyspnea and no chest pain during the stress test.    Stress ECG: There were no arrhythmias during stress. ST elevation and up sloping ST depression was noted. There were no noted arrhythmias during recovery. The ECG was negative for ischemia. Resting ECG: ECG is normal. Resting ECG shows no ST-segment deviation. Normal stress echo @ 105% MPHR. Signed by: Meron aBles MD on 11/22/2022 12:28 PM, Signed by: Unknown Provider Result on 11/22/2022 12:00 AM    No results found for this or any previous visit. No results found for this or any previous visit. Past Medical History:   Diagnosis Date    Headache        Social History     Socioeconomic History    Marital status:      Spouse name: Not on file    Number of children: Not on file    Years of education: Not on file    Highest education level: Not on file   Occupational History    Not on file   Tobacco Use    Smoking status: Never    Smokeless tobacco: Never   Vaping Use    Vaping Use: Never used   Substance and Sexual Activity    Alcohol use:  Yes

## 2023-09-09 ENCOUNTER — PATIENT MESSAGE (OUTPATIENT)
Age: 48
End: 2023-09-09

## 2023-09-21 RX ORDER — EZETIMIBE 10 MG/1
10 TABLET ORAL DAILY
Qty: 90 TABLET | Refills: 3 | Status: SHIPPED | OUTPATIENT
Start: 2023-09-21

## 2024-01-16 DIAGNOSIS — E78.5 HYPERLIPIDEMIA: Primary | ICD-10-CM

## 2024-01-16 NOTE — PROGRESS NOTES
During rescheduling patient asking if lipid needed prior to visit. Per Dr. Mejia may order lipid for prior to OV. Lab req mailed to patient.

## 2024-02-19 DIAGNOSIS — E78.5 HYPERLIPIDEMIA: ICD-10-CM

## 2024-02-19 LAB
CHOLEST SERPL-MCNC: 248 MG/DL
HDLC SERPL-MCNC: 84 MG/DL
HDLC SERPL: 3 (ref 0–5)
LDLC SERPL CALC-MCNC: 146.6 MG/DL (ref 0–100)
TRIGL SERPL-MCNC: 87 MG/DL
VLDLC SERPL CALC-MCNC: 17.4 MG/DL

## 2024-02-21 NOTE — RESULT ENCOUNTER NOTE
Malu,    Your LDL was 146 - up from 10 months ago.  Will discuss more at FU - given elevated Lp (a) would advocate you robert on either statin or PCSK9 inhibitor therapy.    Dr. Mejia

## 2024-03-06 ENCOUNTER — OFFICE VISIT (OUTPATIENT)
Age: 49
End: 2024-03-06
Payer: COMMERCIAL

## 2024-03-06 VITALS
OXYGEN SATURATION: 98 % | DIASTOLIC BLOOD PRESSURE: 80 MMHG | HEART RATE: 70 BPM | RESPIRATION RATE: 16 BRPM | SYSTOLIC BLOOD PRESSURE: 100 MMHG | BODY MASS INDEX: 25.37 KG/M2 | WEIGHT: 147.8 LBS

## 2024-03-06 DIAGNOSIS — E78.49 OTHER HYPERLIPIDEMIA: ICD-10-CM

## 2024-03-06 DIAGNOSIS — Z82.49 FAMILY HISTORY OF PREMATURE CAD: ICD-10-CM

## 2024-03-06 DIAGNOSIS — E78.41 ELEVATED LIPOPROTEIN(A): ICD-10-CM

## 2024-03-06 PROCEDURE — 93000 ELECTROCARDIOGRAM COMPLETE: CPT | Performed by: INTERNAL MEDICINE

## 2024-03-06 PROCEDURE — 99214 OFFICE O/P EST MOD 30 MIN: CPT | Performed by: INTERNAL MEDICINE

## 2024-03-06 NOTE — PROGRESS NOTES
had concerns including Follow-up (7 mo), Chest Pain, and Hyperlipidemia.    Vitals:    03/06/24 0910   BP: 100/80   Site: Left Upper Arm   Position: Sitting   Pulse: 70   Resp: 16   SpO2: 98%   Weight: 67 kg (147 lb 12.8 oz)        Chest pain No    Refills No        1. Have you been to the ER, urgent care clinic since your last visit? No       Hospitalized since your last visit? No       Where?        Date?    
Per Dr. Mejia- follow up one year. Vikram WARD initiated via covermeds KEY:[id number]    
ECG was negative for ischemia.    Stress Test: A Kieran protocol stress test was performed. Overall, the patient's exercise capacity was above average for their age. The patient reached stage 4 of the protocol And was stressed for 11 min and 0 sec. Hemodynamics are adequate for diagnosis. Blood pressure demonstrated a normal response and heart rate demonstrated a normal response to stress. The patient's heart rate recovery was normal. The patient reported dyspnea and no chest pain during the stress test.    Stress ECG: There were no arrhythmias during stress. ST elevation and up sloping ST depression was noted. There were no noted arrhythmias during recovery. The ECG was negative for ischemia.    Resting ECG: ECG is normal. Resting ECG shows no ST-segment deviation.    Normal stress echo @ 105% MPHR.    Signed by: Keara Mejia MD on 11/22/2022 12:28 PM, Signed by: Unknown Provider Result on 11/22/2022 12:00 AM        No results found for this or any previous visit.      No results found for this or any previous visit.       No future appointments.    Keara Mejia MD    [facility] Cardiology  [place]    [address], [address]    [place] [address]    Ph: [phone]

## 2024-03-28 ENCOUNTER — PATIENT MESSAGE (OUTPATIENT)
Age: 49
End: 2024-03-28

## 2024-04-08 ENCOUNTER — CLINICAL DOCUMENTATION (OUTPATIENT)
Age: 49
End: 2024-04-08

## 2024-04-08 NOTE — PROGRESS NOTES
Repatha prior authorization initiated via covermymeds. Denied initially and we requested reconsideration based on zetia failure, elevated Lipoprotein a and statin intolerance.     Auth approved with reconsideration. Fax to follow and will send to scanning.

## 2024-06-09 ENCOUNTER — PATIENT MESSAGE (OUTPATIENT)
Age: 49
End: 2024-06-09

## 2024-06-11 NOTE — TELEPHONE ENCOUNTER
Per verbal order from Dr. Merritt Barahona  Last appt: 3/6/2024     Future Appointments   Date Time Provider Department Center   3/10/2025  9:00 AM Merritt Barahona, MD [facility]       Requested Prescriptions     Signed Prescriptions Disp Refills    Evolocumab 140 MG/ML SOAJ 6 mL 3     Sig: Inject 1 mL into the skin every 14 days     Authorizing Provider: MERRITT BARAHONA     Ordering User: ARTEM SHULTZ

## 2024-09-11 RX ORDER — EZETIMIBE 10 MG/1
10 TABLET ORAL DAILY
Qty: 90 TABLET | Refills: 2 | Status: SHIPPED | OUTPATIENT
Start: 2024-09-11

## 2025-01-23 ENCOUNTER — TELEPHONE (OUTPATIENT)
Age: 50
End: 2025-01-23

## 2025-01-23 NOTE — TELEPHONE ENCOUNTER
Lvm requesting a call back to reschedule upcoming appointment with Dr. Mejia for 3/10/2025. Provider is ROSIE. Please offer next available with NP or Dr. Mejia. Thanks.

## 2025-02-25 ENCOUNTER — TRANSCRIBE ORDERS (OUTPATIENT)
Facility: HOSPITAL | Age: 50
End: 2025-02-25

## 2025-02-25 ENCOUNTER — HOSPITAL ENCOUNTER (OUTPATIENT)
Facility: HOSPITAL | Age: 50
Discharge: HOME | End: 2025-02-28
Payer: COMMERCIAL

## 2025-02-25 DIAGNOSIS — M79.662 PAIN OF LEFT CALF: ICD-10-CM

## 2025-02-25 DIAGNOSIS — M79.662 PAIN OF LEFT CALF: Primary | ICD-10-CM

## 2025-02-25 PROCEDURE — 93971 EXTREMITY STUDY: CPT

## 2025-05-20 ENCOUNTER — PATIENT MESSAGE (OUTPATIENT)
Age: 50
End: 2025-05-20

## 2025-05-20 DIAGNOSIS — E78.5 HYPERLIPIDEMIA: Primary | ICD-10-CM

## 2025-06-05 LAB
CHOLEST SERPL-MCNC: 180 MG/DL (ref 100–199)
HDLC SERPL-MCNC: 90 MG/DL
IMP & REVIEW OF LAB RESULTS: NORMAL
LDLC SERPL CALC-MCNC: 74 MG/DL (ref 0–99)
TRIGL SERPL-MCNC: 88 MG/DL (ref 0–149)
VLDLC SERPL CALC-MCNC: 16 MG/DL (ref 5–40)

## 2025-06-09 ENCOUNTER — OFFICE VISIT (OUTPATIENT)
Age: 50
End: 2025-06-09
Payer: COMMERCIAL

## 2025-06-09 VITALS
BODY MASS INDEX: 25.27 KG/M2 | HEART RATE: 99 BPM | WEIGHT: 148 LBS | SYSTOLIC BLOOD PRESSURE: 110 MMHG | DIASTOLIC BLOOD PRESSURE: 72 MMHG | HEIGHT: 64 IN

## 2025-06-09 DIAGNOSIS — E78.41 ELEVATED LIPOPROTEIN(A): ICD-10-CM

## 2025-06-09 DIAGNOSIS — Z82.49 FAMILY HISTORY OF PREMATURE CAD: ICD-10-CM

## 2025-06-09 DIAGNOSIS — E78.5 HYPERLIPIDEMIA, UNSPECIFIED HYPERLIPIDEMIA TYPE: Primary | ICD-10-CM

## 2025-06-09 DIAGNOSIS — Z71.89 CARDIAC RISK COUNSELING: ICD-10-CM

## 2025-06-09 PROCEDURE — 93000 ELECTROCARDIOGRAM COMPLETE: CPT | Performed by: INTERNAL MEDICINE

## 2025-06-09 PROCEDURE — 99214 OFFICE O/P EST MOD 30 MIN: CPT | Performed by: INTERNAL MEDICINE

## 2025-06-09 NOTE — PROGRESS NOTES
Chief Complaint   Patient presents with    Chest Pain    Hyperlipidemia     Vitals:    06/09/25 1506   BP: 110/72   BP Site: Left Upper Arm   Patient Position: Sitting   Pulse: 99   Weight: 67.1 kg (148 lb)   Height: 1.626 m (5' 4\")     Chest pain: DENIED     Recent hospital stays: DENIED     Refills: DENIED   
normal. The patient reported dyspnea and no chest pain during the stress test.    Stress ECG: There were no arrhythmias during stress. ST elevation and up sloping ST depression was noted. There were no noted arrhythmias during recovery. The ECG was negative for ischemia.    Resting ECG: ECG is normal. Resting ECG shows no ST-segment deviation.    Normal stress echo @ 105% MPHR.    Signed by: Keara Mejia MD on 11/22/2022 12:28 PM, Signed by: Unknown Provider Result on 11/22/2022 12:00 AM    No future appointments.    Keara Mejia MD    [facility] Cardiology  [facility]    [address]    [place] [address]    Ph: [phone]

## 2025-06-11 RX ORDER — EZETIMIBE 10 MG/1
10 TABLET ORAL DAILY
Qty: 90 TABLET | Refills: 2 | OUTPATIENT
Start: 2025-06-11

## 2025-06-11 NOTE — TELEPHONE ENCOUNTER
Per verbal order from Dr. Keara Mejia  Last appt: 3/6/2024     Future Appointments   Date Time Provider Department Center   12/22/2025  2:00 PM Keara Mejia MD [facility]       Requested Prescriptions     Refused Prescriptions Disp Refills    ezetimibe (ZETIA) 10 MG tablet [Pharmacy Med Name: EZETIMIBE 10 MG TABLET] 90 tablet 2     Sig: TAKE 1 TABLET BY MOUTH EVERY DAY     Refused By: ARTEM SHULTZ     Reason for Refusal: Medication discontinued

## 2025-07-15 ENCOUNTER — PATIENT MESSAGE (OUTPATIENT)
Age: 50
End: 2025-07-15

## 2025-07-16 RX ORDER — EVOLOCUMAB 140 MG/ML
INJECTION, SOLUTION SUBCUTANEOUS
Qty: 2 ML | Refills: 11 | Status: SHIPPED | OUTPATIENT
Start: 2025-07-16

## 2025-07-16 NOTE — TELEPHONE ENCOUNTER
Per verbal order from Dr. Merritt Barahona  Last appt: 6/9/2025     Future Appointments   Date Time Provider Department Center   7/24/2025  5:00 PM [facility]   12/22/2025  2:00 PM Merritt Barahona MD [facility]       Requested Prescriptions     Signed Prescriptions Disp Refills    Evolocumab (REPATHA SURECLICK) SOAJ pen 2 mL 11     Sig: INJECT 1 ML INTO THE SKIN EVERY 14 DAYS *INSU ONLY COVERS 1 MONTH SCRIPTS*     Authorizing Provider: MERRITT BARAHONA     Ordering User: ARTEM SHULTZ

## 2025-07-24 ENCOUNTER — OFFICE VISIT (OUTPATIENT)
Age: 50
End: 2025-07-24
Payer: COMMERCIAL

## 2025-07-24 VITALS — HEIGHT: 64 IN | BODY MASS INDEX: 25.27 KG/M2 | WEIGHT: 148 LBS

## 2025-07-24 DIAGNOSIS — M25.462 KNEE EFFUSION, LEFT: Primary | ICD-10-CM

## 2025-07-24 DIAGNOSIS — M25.562 LEFT KNEE PAIN, UNSPECIFIED CHRONICITY: ICD-10-CM

## 2025-07-24 PROCEDURE — 20610 DRAIN/INJ JOINT/BURSA W/O US: CPT | Performed by: STUDENT IN AN ORGANIZED HEALTH CARE EDUCATION/TRAINING PROGRAM

## 2025-07-24 PROCEDURE — 99204 OFFICE O/P NEW MOD 45 MIN: CPT | Performed by: STUDENT IN AN ORGANIZED HEALTH CARE EDUCATION/TRAINING PROGRAM

## 2025-07-24 RX ORDER — MELOXICAM 15 MG/1
7.5 TABLET ORAL DAILY
Qty: 15 TABLET | Refills: 0 | Status: SHIPPED | OUTPATIENT
Start: 2025-07-24

## 2025-07-24 ASSESSMENT — PATIENT HEALTH QUESTIONNAIRE - PHQ9
SUM OF ALL RESPONSES TO PHQ QUESTIONS 1-9: 0
1. LITTLE INTEREST OR PLEASURE IN DOING THINGS: NOT AT ALL
2. FEELING DOWN, DEPRESSED OR HOPELESS: NOT AT ALL
SUM OF ALL RESPONSES TO PHQ QUESTIONS 1-9: 0

## 2025-07-24 NOTE — PROGRESS NOTES
CHIEF CONCERN: *** knee pain    HISTORY:  ***      PMH:  Past Medical History:   Diagnosis Date    Headache        PSxHx:  Past Surgical History:   Procedure Laterality Date    TUBAL LIGATION         Meds:    Current Outpatient Medications:     Evolocumab (REPATHA SURECLICK) SOAJ pen, INJECT 1 ML INTO THE SKIN EVERY 14 DAYS *INSU ONLY COVERS 1 MONTH SCRIPTS*, Disp: 2 mL, Rfl: 11    Rimegepant Sulfate (NURTEC) 75 MG TBDP, Take 75 mg by mouth daily as needed, Disp: , Rfl:     All:  No Known Allergies      Family Hx:  Family History   Problem Relation Age of Onset    Headache Mother     Stroke Paternal Grandfather     Breast Cancer Maternal Grandmother 50    Cancer Maternal Grandmother     Breast Cancer Mother 64    Cancer Mother            Social Hx:  Social History     Tobacco Use    Smoking status: Never    Smokeless tobacco: Never   Vaping Use    Vaping status: Never Used   Substance Use Topics    Alcohol use: Yes    Drug use: No         EXAM:    There were no vitals filed for this visit.     General: AOX3, no apparent distress  Psychiatric: mood and affect appropriate  Lungs: breathing is symmetric and unlabored bilaterally  Heart: regular rate and rhythm  Abdomen: no guarding  Head: normocephalic, atraumatic  Skin: No significant abnormalities, good turgor  Sensation intact to light touch: C5-T1 dermatomes  Muscular exam: 5/5 strength in all major muscle groups unless noted in specialty exam.    There is no height or weight on file to calculate BMI.    *** Lower extremity :   Skin is Warm and Well perfused, no suggestion of infection  Knee ROM: *** active and passive  Tenderness: *** over the *** joint line. ***  Patella: *** pain with patella grind. Patella translation of *** quadrants ***. *** J Sign  Stability: *** stable to varus and valgus stress in extension and 30 degrees of flexion.   - 1A Lachman's.   - 1A posterior drawer.   Meniscus: *** McMurrays.   Strength: *** Able to actively straight leg raise 
and rhythm  Abdomen: no guarding  Head: normocephalic, atraumatic  Skin: No significant abnormalities, good turgor  Sensation intact to light touch: C5-T1 dermatomes  Muscular exam: 5/5 strength in all major muscle groups unless noted in specialty exam.    Body mass index is 25.39 kg/m².    Left lower extremity :  On examination of the left knee there is a visible and palpable extremely large tense effusion present.  Her motion is approximately 0 to 45 degrees with significant pain beyond 45 degrees.  Pain localizes to the knee proximal to the patella where the effusion is palpable.  No ligamentous instability on exam.  No joint line tenderness.  Tender around the superior knee.  Neurovascular intact distally.  Demonstrates full strength in the knee with flexion and extension.    IMAGING:    XR KNEE LEFT (MIN 4 VIEWS)  4 views of the left knee demonstrate no fractures, no dislocations, no   significant degenerative changes.  There is a relatively deep trochlea   with a concentric patella in the bilateral knees seen on the sunrise view.       ASSESSMENT:    Encounter Diagnoses   Name Primary?    Left knee pain, unspecified chronicity     Knee effusion, left Yes   Atraumatic recurrent knee effusions.  Suspect intra-articular pathology versus a rheumatological condition.  She states that she had rheumatology lab work done not too long ago that was negative and no personal history of other joint pains.    PLAN:  MRI of the left knee to evaluate for intra-articular knee pathology that could be causing these effusions.  Encourage frequent compression  Effusion aspirated in clinic today which provided patient much pain relief.  She was able to demonstrate 0 to 100 degrees range of motion without pain after aspiration.  Prescribe patient meloxicam for pain control  Follow-up after MRI completed with a telephone visit.    Chantal Fagan MD  Orthopedic Surgery - Sports Medicine    Procedure note:  Aspiration of left knee

## 2025-07-31 ENCOUNTER — HOSPITAL ENCOUNTER (OUTPATIENT)
Facility: HOSPITAL | Age: 50
Discharge: HOME | End: 2025-07-31
Attending: STUDENT IN AN ORGANIZED HEALTH CARE EDUCATION/TRAINING PROGRAM
Payer: COMMERCIAL

## 2025-07-31 DIAGNOSIS — M25.462 KNEE EFFUSION, LEFT: ICD-10-CM

## 2025-07-31 PROCEDURE — 73721 MRI JNT OF LWR EXTRE W/O DYE: CPT

## 2025-08-07 ENCOUNTER — SCHEDULED TELEPHONE ENCOUNTER (OUTPATIENT)
Age: 50
End: 2025-08-07
Payer: COMMERCIAL

## 2025-08-07 DIAGNOSIS — M65.962 SYNOVITIS OF LEFT KNEE: Primary | ICD-10-CM

## 2025-08-07 PROCEDURE — 99213 OFFICE O/P EST LOW 20 MIN: CPT | Performed by: STUDENT IN AN ORGANIZED HEALTH CARE EDUCATION/TRAINING PROGRAM

## 2025-08-11 ENCOUNTER — TELEPHONE (OUTPATIENT)
Age: 50
End: 2025-08-11

## 2025-08-14 ENCOUNTER — HOSPITAL ENCOUNTER (OUTPATIENT)
Facility: HOSPITAL | Age: 50
Discharge: HOME | End: 2025-08-17
Payer: COMMERCIAL

## 2025-08-14 VITALS — WEIGHT: 148 LBS | BODY MASS INDEX: 25.39 KG/M2

## 2025-08-14 DIAGNOSIS — Z12.31 VISIT FOR SCREENING MAMMOGRAM: ICD-10-CM

## 2025-08-14 PROCEDURE — 77063 BREAST TOMOSYNTHESIS BI: CPT

## 2025-08-19 ENCOUNTER — TELEPHONE (OUTPATIENT)
Age: 50
End: 2025-08-19

## 2025-08-19 ENCOUNTER — SCHEDULED TELEPHONE ENCOUNTER (OUTPATIENT)
Age: 50
End: 2025-08-19

## 2025-08-19 DIAGNOSIS — M65.962 SYNOVITIS OF LEFT KNEE: Primary | ICD-10-CM

## 2025-08-20 ENCOUNTER — HOSPITAL ENCOUNTER (OUTPATIENT)
Facility: HOSPITAL | Age: 50
Discharge: HOME | End: 2025-08-23
Payer: COMMERCIAL

## 2025-08-20 DIAGNOSIS — R92.8 ABNORMAL MAMMOGRAM OF LEFT BREAST: ICD-10-CM

## 2025-08-20 PROCEDURE — 76642 ULTRASOUND BREAST LIMITED: CPT

## 2025-08-20 PROCEDURE — G0279 TOMOSYNTHESIS, MAMMO: HCPCS
